# Patient Record
Sex: FEMALE | Race: WHITE | NOT HISPANIC OR LATINO | URBAN - METROPOLITAN AREA
[De-identification: names, ages, dates, MRNs, and addresses within clinical notes are randomized per-mention and may not be internally consistent; named-entity substitution may affect disease eponyms.]

---

## 2018-03-20 ENCOUNTER — OFFICE VISIT CONVERTED (OUTPATIENT)
Dept: FAMILY MEDICINE CLINIC | Facility: CLINIC | Age: 48
End: 2018-03-20
Attending: FAMILY MEDICINE

## 2018-03-29 ENCOUNTER — OFFICE VISIT CONVERTED (OUTPATIENT)
Dept: ORTHOPEDIC SURGERY | Facility: CLINIC | Age: 48
End: 2018-03-29
Attending: ORTHOPAEDIC SURGERY

## 2019-07-01 ENCOUNTER — OFFICE VISIT CONVERTED (OUTPATIENT)
Dept: FAMILY MEDICINE CLINIC | Facility: CLINIC | Age: 49
End: 2019-07-01
Attending: FAMILY MEDICINE

## 2019-07-01 ENCOUNTER — HOSPITAL ENCOUNTER (OUTPATIENT)
Dept: FAMILY MEDICINE CLINIC | Facility: CLINIC | Age: 49
Discharge: HOME | End: 2019-07-01
Attending: FAMILY MEDICINE

## 2019-07-01 LAB
ALBUMIN SERPL-MCNC: 4.2 G/DL (ref 3.5–5)
ALBUMIN/GLOB SERPL: 1.6 {RATIO} (ref 1.4–2.6)
ALP SERPL-CCNC: 83 U/L (ref 42–98)
ALT SERPL-CCNC: 9 U/L (ref 10–40)
AMPHETAMINES UR QL SCN: NEGATIVE
ANION GAP SERPL CALC-SCNC: 14 MMOL/L (ref 8–19)
AST SERPL-CCNC: 9 U/L (ref 15–50)
BARBITURATES UR QL SCN: NEGATIVE
BASOPHILS # BLD AUTO: 0.06 10*3/UL (ref 0–0.2)
BASOPHILS NFR BLD AUTO: 0.7 % (ref 0–3)
BENZODIAZ UR QL SCN: NEGATIVE
BILIRUB SERPL-MCNC: 0.28 MG/DL (ref 0.2–1.3)
BUN SERPL-MCNC: 10 MG/DL (ref 5–25)
BUN/CREAT SERPL: 10 {RATIO} (ref 6–20)
CALCIUM SERPL-MCNC: 9.1 MG/DL (ref 8.7–10.4)
CHLORIDE SERPL-SCNC: 100 MMOL/L (ref 99–111)
CHOLEST SERPL-MCNC: 174 MG/DL (ref 107–200)
CHOLEST/HDLC SERPL: 5.1 {RATIO} (ref 3–6)
CONV ABS IMM GRAN: 0.02 10*3/UL (ref 0–0.2)
CONV CO2: 30 MMOL/L (ref 22–32)
CONV COCAINE, UR: NEGATIVE
CONV IMMATURE GRAN: 0.2 % (ref 0–1.8)
CONV TOTAL PROTEIN: 6.9 G/DL (ref 6.3–8.2)
CREAT UR-MCNC: 0.96 MG/DL (ref 0.5–0.9)
DEPRECATED RDW RBC AUTO: 47.2 FL (ref 36.4–46.3)
EOSINOPHIL # BLD AUTO: 0.14 10*3/UL (ref 0–0.7)
EOSINOPHIL # BLD AUTO: 1.5 % (ref 0–7)
ERYTHROCYTE [DISTWIDTH] IN BLOOD BY AUTOMATED COUNT: 12.7 % (ref 11.7–14.4)
EST. AVERAGE GLUCOSE BLD GHB EST-MCNC: 128 MG/DL
FOLATE SERPL-MCNC: 5.1 NG/ML (ref 4.8–20)
GFR SERPLBLD BASED ON 1.73 SQ M-ARVRAT: >60 ML/MIN/{1.73_M2}
GLOBULIN UR ELPH-MCNC: 2.7 G/DL (ref 2–3.5)
GLUCOSE SERPL-MCNC: 148 MG/DL (ref 65–99)
HBA1C MFR BLD: 13.8 G/DL (ref 12–16)
HBA1C MFR BLD: 6.1 % (ref 3.5–5.7)
HCT VFR BLD AUTO: 43.2 % (ref 37–47)
HDLC SERPL-MCNC: 34 MG/DL (ref 40–60)
LDLC SERPL CALC-MCNC: 98 MG/DL (ref 70–100)
LYMPHOCYTES # BLD AUTO: 3.42 10*3/UL (ref 1–5)
MCH RBC QN AUTO: 31.9 PG (ref 27–31)
MCHC RBC AUTO-ENTMCNC: 31.9 G/DL (ref 33–37)
MCV RBC AUTO: 100 FL (ref 81–99)
METHADONE UR QL SCN: NEGATIVE
MONOCYTES # BLD AUTO: 0.38 10*3/UL (ref 0.2–1.2)
MONOCYTES NFR BLD AUTO: 4.2 % (ref 3–10)
NEUTROPHILS # BLD AUTO: 5.13 10*3/UL (ref 2–8)
NEUTROPHILS NFR BLD AUTO: 56 % (ref 30–85)
NRBC CBCN: 0 % (ref 0–0.7)
OPIATES TESTED UR SCN: NEGATIVE
OSMOLALITY SERPL CALC.SUM OF ELEC: 294 MOSM/KG (ref 273–304)
OXYCODONE UR QL SCN: NEGATIVE
PCP UR QL: NEGATIVE
PLATELET # BLD AUTO: 227 10*3/UL (ref 130–400)
PMV BLD AUTO: 12.6 FL (ref 9.4–12.3)
POTASSIUM SERPL-SCNC: 3.4 MMOL/L (ref 3.5–5.3)
RBC # BLD AUTO: 4.32 10*6/UL (ref 4.2–5.4)
SODIUM SERPL-SCNC: 141 MMOL/L (ref 135–147)
THC SERPLBLD CFM-MCNC: POSITIVE NG/ML
TRIGL SERPL-MCNC: 212 MG/DL (ref 40–150)
TSH SERPL-ACNC: 4.07 M[IU]/L (ref 0.27–4.2)
VARIANT LYMPHS NFR BLD MANUAL: 37.4 % (ref 20–45)
VIT B12 SERPL-MCNC: 417 PG/ML (ref 211–911)
VLDLC SERPL-MCNC: 42 MG/DL (ref 5–37)
WBC # BLD AUTO: 9.15 10*3/UL (ref 4.8–10.8)

## 2019-07-03 LAB
AMOXICILLIN+CLAV SUSC ISLT: >=32
AMPICILLIN SUSC ISLT: >=32
AMPICILLIN+SULBAC SUSC ISLT: >=32
BACTERIA UR CULT: ABNORMAL
CEFAZOLIN SUSC ISLT: >=64
CEFEPIME SUSC ISLT: 2
CEFTAZIDIME SUSC ISLT: <=1
CEFTRIAXONE SUSC ISLT: >=64
CEFUROXIME ORAL SUSC ISLT: >=64
CEFUROXIME PARENTER SUSC ISLT: >=64
CIPROFLOXACIN SUSC ISLT: >=4
ERTAPENEM SUSC ISLT: <=0.5
GENTAMICIN SUSC ISLT: <=1
LEVOFLOXACIN SUSC ISLT: >=8
NITROFURANTOIN SUSC ISLT: <=16
TETRACYCLINE SUSC ISLT: 2
TMP SMX SUSC ISLT: >=320
TOBRAMYCIN SUSC ISLT: <=1

## 2019-07-08 ENCOUNTER — HOSPITAL ENCOUNTER (OUTPATIENT)
Dept: FAMILY MEDICINE CLINIC | Facility: CLINIC | Age: 49
Discharge: HOME | End: 2019-07-08
Attending: FAMILY MEDICINE

## 2019-07-08 ENCOUNTER — OFFICE VISIT CONVERTED (OUTPATIENT)
Dept: FAMILY MEDICINE CLINIC | Facility: CLINIC | Age: 49
End: 2019-07-08
Attending: FAMILY MEDICINE

## 2019-07-08 ENCOUNTER — CONVERSION ENCOUNTER (OUTPATIENT)
Dept: FAMILY MEDICINE CLINIC | Facility: CLINIC | Age: 49
End: 2019-07-08

## 2019-07-08 LAB
ALBUMIN SERPL-MCNC: 4.5 G/DL (ref 3.5–5)
ALBUMIN/GLOB SERPL: 1.5 {RATIO} (ref 1.4–2.6)
ALP SERPL-CCNC: 88 U/L (ref 42–98)
ALT SERPL-CCNC: 12 U/L (ref 10–40)
ANION GAP SERPL CALC-SCNC: 20 MMOL/L (ref 8–19)
AST SERPL-CCNC: 12 U/L (ref 15–50)
BILIRUB SERPL-MCNC: 0.38 MG/DL (ref 0.2–1.3)
BUN SERPL-MCNC: 9 MG/DL (ref 5–25)
BUN/CREAT SERPL: 9 {RATIO} (ref 6–20)
CALCIUM SERPL-MCNC: 9.6 MG/DL (ref 8.7–10.4)
CHLORIDE SERPL-SCNC: 96 MMOL/L (ref 99–111)
CK SERPL-CCNC: 76 U/L (ref 35–230)
CONV CO2: 29 MMOL/L (ref 22–32)
CONV TOTAL PROTEIN: 7.6 G/DL (ref 6.3–8.2)
CREAT UR-MCNC: 0.99 MG/DL (ref 0.5–0.9)
GFR SERPLBLD BASED ON 1.73 SQ M-ARVRAT: >60 ML/MIN/{1.73_M2}
GLOBULIN UR ELPH-MCNC: 3.1 G/DL (ref 2–3.5)
GLUCOSE SERPL-MCNC: 98 MG/DL (ref 65–99)
MAGNESIUM SERPL-MCNC: 2.15 MG/DL (ref 1.6–2.3)
OSMOLALITY SERPL CALC.SUM OF ELEC: 291 MOSM/KG (ref 273–304)
POTASSIUM SERPL-SCNC: 3.8 MMOL/L (ref 3.5–5.3)
SODIUM SERPL-SCNC: 141 MMOL/L (ref 135–147)

## 2019-07-10 LAB
AMOXICILLIN+CLAV SUSC ISLT: >=32
AMPICILLIN SUSC ISLT: >=32
AMPICILLIN+SULBAC SUSC ISLT: >=32
BACTERIA UR CULT: ABNORMAL
CEFAZOLIN SUSC ISLT: >=64
CEFEPIME SUSC ISLT: 2
CEFTAZIDIME SUSC ISLT: <=1
CEFTRIAXONE SUSC ISLT: 32
CEFUROXIME ORAL SUSC ISLT: >=64
CEFUROXIME PARENTER SUSC ISLT: >=64
CIPROFLOXACIN SUSC ISLT: >=4
ERTAPENEM SUSC ISLT: <=0.5
GENTAMICIN SUSC ISLT: <=1
LEVOFLOXACIN SUSC ISLT: >=8
NITROFURANTOIN SUSC ISLT: <=16
TETRACYCLINE SUSC ISLT: 2
TMP SMX SUSC ISLT: >=320
TOBRAMYCIN SUSC ISLT: <=1

## 2019-09-18 ENCOUNTER — CONVERSION ENCOUNTER (OUTPATIENT)
Dept: NEUROLOGY | Facility: CLINIC | Age: 49
End: 2019-09-18

## 2019-09-18 ENCOUNTER — OFFICE VISIT CONVERTED (OUTPATIENT)
Dept: NEUROLOGY | Facility: CLINIC | Age: 49
End: 2019-09-18
Attending: PSYCHIATRY & NEUROLOGY

## 2019-10-16 ENCOUNTER — OFFICE VISIT CONVERTED (OUTPATIENT)
Dept: NEUROLOGY | Facility: CLINIC | Age: 49
End: 2019-10-16
Attending: PSYCHIATRY & NEUROLOGY

## 2019-11-21 ENCOUNTER — OFFICE VISIT CONVERTED (OUTPATIENT)
Dept: FAMILY MEDICINE CLINIC | Facility: CLINIC | Age: 49
End: 2019-11-21
Attending: FAMILY MEDICINE

## 2019-11-21 ENCOUNTER — HOSPITAL ENCOUNTER (OUTPATIENT)
Dept: FAMILY MEDICINE CLINIC | Facility: CLINIC | Age: 49
Discharge: HOME | End: 2019-11-21
Attending: FAMILY MEDICINE

## 2020-02-20 ENCOUNTER — OFFICE VISIT CONVERTED (OUTPATIENT)
Dept: ORTHOPEDIC SURGERY | Facility: CLINIC | Age: 50
End: 2020-02-20
Attending: ORTHOPAEDIC SURGERY

## 2020-03-24 ENCOUNTER — TELEMEDICINE CONVERTED (OUTPATIENT)
Dept: NEUROLOGY | Facility: CLINIC | Age: 50
End: 2020-03-24
Attending: PSYCHIATRY & NEUROLOGY

## 2020-04-15 ENCOUNTER — HOSPITAL ENCOUNTER (OUTPATIENT)
Dept: URGENT CARE | Facility: CLINIC | Age: 50
Discharge: HOME | End: 2020-04-15
Attending: EMERGENCY MEDICINE

## 2020-07-04 LAB — SARS-COV-2 RNA SPEC QL NAA+PROBE: NOT DETECTED

## 2020-07-06 ENCOUNTER — HOSPITAL ENCOUNTER (OUTPATIENT)
Dept: PERIOP | Facility: HOSPITAL | Age: 50
Setting detail: HOSPITAL OUTPATIENT SURGERY
Discharge: HOME | End: 2020-07-06
Attending: ORTHOPAEDIC SURGERY

## 2020-07-21 ENCOUNTER — CONVERSION ENCOUNTER (OUTPATIENT)
Dept: ORTHOPEDIC SURGERY | Facility: CLINIC | Age: 50
End: 2020-07-21

## 2020-07-21 ENCOUNTER — OFFICE VISIT CONVERTED (OUTPATIENT)
Dept: ORTHOPEDIC SURGERY | Facility: CLINIC | Age: 50
End: 2020-07-21
Attending: ORTHOPAEDIC SURGERY

## 2020-09-14 ENCOUNTER — HOSPITAL ENCOUNTER (OUTPATIENT)
Dept: FAMILY MEDICINE CLINIC | Facility: CLINIC | Age: 50
Discharge: HOME | End: 2020-09-14
Attending: FAMILY MEDICINE

## 2020-09-14 ENCOUNTER — OFFICE VISIT CONVERTED (OUTPATIENT)
Dept: FAMILY MEDICINE CLINIC | Facility: CLINIC | Age: 50
End: 2020-09-14
Attending: FAMILY MEDICINE

## 2020-09-14 LAB
ALBUMIN SERPL-MCNC: 4.3 G/DL (ref 3.5–5)
ALBUMIN/GLOB SERPL: 1.8 {RATIO} (ref 1.4–2.6)
ALP SERPL-CCNC: 72 U/L (ref 42–98)
ALT SERPL-CCNC: 9 U/L (ref 10–40)
ANION GAP SERPL CALC-SCNC: 14 MMOL/L (ref 8–19)
AST SERPL-CCNC: 13 U/L (ref 15–50)
BASOPHILS # BLD AUTO: 0.03 10*3/UL (ref 0–0.2)
BASOPHILS NFR BLD AUTO: 0.3 % (ref 0–3)
BILIRUB SERPL-MCNC: 0.22 MG/DL (ref 0.2–1.3)
BUN SERPL-MCNC: 9 MG/DL (ref 5–25)
BUN/CREAT SERPL: 11 {RATIO} (ref 6–20)
CALCIUM SERPL-MCNC: 9 MG/DL (ref 8.7–10.4)
CHLORIDE SERPL-SCNC: 102 MMOL/L (ref 99–111)
CHOLEST SERPL-MCNC: 246 MG/DL (ref 107–200)
CHOLEST/HDLC SERPL: 6.5 {RATIO} (ref 3–6)
CONV ABS IMM GRAN: 0.03 10*3/UL (ref 0–0.2)
CONV CO2: 30 MMOL/L (ref 22–32)
CONV CREATININE URINE, RANDOM: 50 MG/DL (ref 10–300)
CONV IMMATURE GRAN: 0.3 % (ref 0–1.8)
CONV MICROALBUM.,U,RANDOM: <12 MG/L (ref 0–20)
CONV TOTAL PROTEIN: 6.7 G/DL (ref 6.3–8.2)
CREAT UR-MCNC: 0.8 MG/DL (ref 0.5–0.9)
DEPRECATED RDW RBC AUTO: 48.8 FL (ref 36.4–46.3)
EOSINOPHIL # BLD AUTO: 0.16 10*3/UL (ref 0–0.7)
EOSINOPHIL # BLD AUTO: 1.5 % (ref 0–7)
ERYTHROCYTE [DISTWIDTH] IN BLOOD BY AUTOMATED COUNT: 13.2 % (ref 11.7–14.4)
EST. AVERAGE GLUCOSE BLD GHB EST-MCNC: 134 MG/DL
FOLATE SERPL-MCNC: 6.3 NG/ML (ref 4.8–20)
GFR SERPLBLD BASED ON 1.73 SQ M-ARVRAT: >60 ML/MIN/{1.73_M2}
GLOBULIN UR ELPH-MCNC: 2.4 G/DL (ref 2–3.5)
GLUCOSE SERPL-MCNC: 130 MG/DL (ref 65–99)
HBA1C MFR BLD: 6.3 % (ref 3.5–5.7)
HCT VFR BLD AUTO: 42.8 % (ref 37–47)
HDLC SERPL-MCNC: 38 MG/DL (ref 40–60)
HGB BLD-MCNC: 13.8 G/DL (ref 12–16)
LDLC SERPL CALC-MCNC: 175 MG/DL (ref 70–100)
LYMPHOCYTES # BLD AUTO: 3.28 10*3/UL (ref 1–5)
LYMPHOCYTES NFR BLD AUTO: 31.6 % (ref 20–45)
MCH RBC QN AUTO: 32.2 PG (ref 27–31)
MCHC RBC AUTO-ENTMCNC: 32.2 G/DL (ref 33–37)
MCV RBC AUTO: 100 FL (ref 81–99)
MICROALBUMIN/CREAT UR: 24 MG/G{CRE} (ref 0–35)
MONOCYTES # BLD AUTO: 0.42 10*3/UL (ref 0.2–1.2)
MONOCYTES NFR BLD AUTO: 4.1 % (ref 3–10)
NEUTROPHILS # BLD AUTO: 6.45 10*3/UL (ref 2–8)
NEUTROPHILS NFR BLD AUTO: 62.2 % (ref 30–85)
NRBC CBCN: 0 % (ref 0–0.7)
OSMOLALITY SERPL CALC.SUM OF ELEC: 294 MOSM/KG (ref 273–304)
PLATELET # BLD AUTO: 193 10*3/UL (ref 130–400)
PMV BLD AUTO: 12.6 FL (ref 9.4–12.3)
POTASSIUM SERPL-SCNC: 3.6 MMOL/L (ref 3.5–5.3)
RBC # BLD AUTO: 4.28 10*6/UL (ref 4.2–5.4)
SODIUM SERPL-SCNC: 142 MMOL/L (ref 135–147)
T4 FREE SERPL-MCNC: 1 NG/DL (ref 0.9–1.8)
TRIGL SERPL-MCNC: 163 MG/DL (ref 40–150)
TSH SERPL-ACNC: 4.62 M[IU]/L (ref 0.27–4.2)
VIT B12 SERPL-MCNC: 344 PG/ML (ref 211–911)
VLDLC SERPL-MCNC: 33 MG/DL (ref 5–37)
WBC # BLD AUTO: 10.37 10*3/UL (ref 4.8–10.8)

## 2021-04-01 ENCOUNTER — HOSPITAL ENCOUNTER (OUTPATIENT)
Dept: PREADMISSION TESTING | Facility: HOSPITAL | Age: 51
Discharge: HOME | End: 2021-04-01
Attending: ORTHOPAEDIC SURGERY

## 2021-04-01 ENCOUNTER — OFFICE VISIT CONVERTED (OUTPATIENT)
Dept: ORTHOPEDIC SURGERY | Facility: CLINIC | Age: 51
End: 2021-04-01
Attending: ORTHOPAEDIC SURGERY

## 2021-04-02 LAB — SARS-COV-2 RNA SPEC QL NAA+PROBE: NOT DETECTED

## 2021-04-05 ENCOUNTER — HOSPITAL ENCOUNTER (OUTPATIENT)
Dept: PERIOP | Facility: HOSPITAL | Age: 51
Setting detail: HOSPITAL OUTPATIENT SURGERY
Discharge: HOME | End: 2021-04-05
Attending: ORTHOPAEDIC SURGERY

## 2021-04-16 ENCOUNTER — OFFICE VISIT CONVERTED (OUTPATIENT)
Dept: ORTHOPEDIC SURGERY | Facility: CLINIC | Age: 51
End: 2021-04-16
Attending: ORTHOPAEDIC SURGERY

## 2021-05-07 NOTE — PROGRESS NOTES
Progress Note      Patient Name: Shantal Blackman   Patient ID: [id number]   Sex: Female   YOB: 1970    Primary Care Provider: Jevon Fernandez MD   Referring Provider: Jevon Fernandez MD    Visit Date: July 8, 2019    Provider: Jevon Fernandez MD   Location: [facility] [place]   Location Address: [address] [place] [address]   [place]  [address]   Location Phone: [phone]          Chief Complaint     1 week follow up for UTI. still having some issues, still strong.    today punched the van. right hand swollen bruised and painful. 2-4 metatarsal heads and shafts. worse swelling and bruise at 3-4 upper metatarsals.       History Of Present Illness  Shantal Blackman is a 49 year old /White female who presents for evaluation and treatment of:      pt still having urinary symptoms- bilaterally back- a little dysuria- pt says no chance for any STD  discussed labs  hyperlipidemia- uncontrolled  DM II- controlled  hypokalemia  pt having muscle pains in back- no bowel or bladder incontinence  right hand pain and bruising x 2 days after hitting van because she developed flat tire    pt told to wear sunscreen when out in sun and taking doxycycline    xrays:no fxs    pt told to call if not better in 1 week and then may refer to hand surgery       Past Medical History  Disease Name Date Onset Notes   Anxiety --  --    Arthritis --  --    Bipolar disorder --  --    COPD (chronic obstructive pulmonary disease) --  --    Depression --  --    Diabetes mellitus, type 2 --  --    GERD (gastroesophageal reflux disease) --  --    Irritable bowel syndrome --  --    Migraines --  --    Seasonal allergies --  --    Suicide Attempt; unspecified means --  --          Past Surgical History  Procedure Name Date Notes   Hysterectomy --  --    Hysterectomy (due to cancer) --  --    Tubal ligation --  --          Medication List  Name Date Started Instructions   aspirin 81 mg oral tablet,delayed release  (DR/EC) 03/27/2018 TAKE ONE TABLET BY MOUTH DAILY   furosemide 20 mg oral tablet 03/20/2018 take 1 tablet (20 mg) by oral route once daily for 30 days   gabapentin 400 mg oral capsule  take 2 capsules by oral route 3 times a day   hydrocodone-acetaminophen 7.5-325 mg oral tablet  take 1 tablet by oral route every 4-6 hours as needed for pain   Linzess 145 mcg oral capsule 03/20/2018 TAKE ONE CAPSULE BY MOUTH DAILY AS NEEDED FOR CONSTIPATION   pantoprazole 40 mg oral tablet,delayed release (DR/EC) 03/20/2018 TAKE ONE TABLET BY MOUTH DAILY 30 MINUTES BEFORE BREAKFAST DAILY   potassium chloride 10 mEq oral capsule, extended release 07/02/2019 take 1 capsule (10 meq) by oral route once daily with food for 30 days         Allergy List  Allergen Name Date Reaction Notes   Celexa --  --  --    PENICILLINS --  --  --          Family Medical History  Disease Name Relative/Age Notes   Chronic Obstructive Pulmonary Disease Mother/   Mother   Bipolar Disorder Mother/   Mother   Depression Mother/   Mother   DM Type II Mother/   Mother   Hypertension Mother/   Mother   Alcohol Abuse Father/   Father         Social History  Finding Status Start/Stop Quantity Notes   Alcohol Former --/-- --  drank alcohol in the past, 7 or less drinks per week   Exercises regularly --  --/-- --  occasionally   Recreational Drug Use Never --/-- --  never used   Second hand smoke exposure Unknown --/-- --  no   Tobacco Current every day --/-- 3-4 per day current some day smoker, for less than 5 years, currently uses other tobacco products   Uses seatbelts --  --/-- --  yes         Review of Systems  · Constitutional  o Denies  o : fatigue, fever  · Cardiovascular  o Denies  o : chest pain, palpitations  · Respiratory  o Denies  o : shortness of breath, cough  · Gastrointestinal  o Denies  o : nausea, vomiting, diarrhea  · Genitourinary  o Admits  o : dysuria  o Denies  o : urinary retention  · Musculoskeletal  o Admits  o : muscle pain, back  "pain      Vitals  Date Time BP Position Site L\R Cuff Size HR RR TEMP (F) WT  HT  BMI kg/m2 BSA m2 O2 Sat HC       07/08/2019 01:18 /82 Sitting    77 - R  96.7 163lbs 0oz 5'  9\" 24.07 1.9 98 %          Physical Examination  · Constitutional  o Appearance  o : well developed, well-nourished, in no acute distress  · Head and Face  o HEENT  o : Unremarkable  · Eyes  o Conjunctivae  o : conjunctivae normal  · Neck  o Inspection/Palpation  o : supple  o Thyroid  o : no thyromegaly  · Respiratory  o Respiratory Effort  o : breathing unlabored  o Auscultation of Lungs  o : clear to ascultation  · Cardiovascular  o Heart  o :   § Auscultation of Heart  § : regular rate and rhythm  o Peripheral Vascular System  o :   § Extremities  § : no edema  · Gastrointestinal  o Abdomen  o : soft, non-tender, non-distended, + bowel sounds, no hepatosplenomegaly, no masses palpated, no CVA tenderness  · Musculoskeletal  o General  o :   § General Musculoskeletal  § : No joint swelling or deformity., Muscle tone, strength, and development grossly normal.  · Neurologic  o Gait and Station  o :   § Gait Screening  § : normal gait  · Psychiatric  o Mood and Affect  o : mood normal, affect appropriate          Assessment  · Diabetes mellitus, type 2     250.00/E11.9  · Hyperlipidemia     272.4/E78.5  · Hand injury, right, initial encounter     959.4/S69.91XA  · UTI (urinary tract infection)     599.0/N39.0  · Hypokalemia     276.8/E87.6  · Muscle pain     729.1/M79.10  · Back pain     724.5/M54.9      Plan  · Orders  o CMP [facility] (99473) - 276.8/E87.6, 250.00/E11.9 - 07/08/2019  o IOP - Urinalysis without Microscopy (Clinitek) [facility] (41840) - 959.4/S69.91XA, 599.0/N39.0 - 07/08/2019   GLU NEG, KEVYN NEG, KET NEG, SG 1.025, BLO NEG, PH 6.5, PRO NEG, URO 0.2, NIT NEG, STACEY TRACE  o Urine culture (47899, 04983) - 959.4/S69.91XA, 599.0/N39.0 - 07/08/2019  o ACO-39: Current medications updated and reviewed () - - 07/08/2019  o Xray hand right " [facility] ([id number]-EK) - 959.4/S69.91XA - 07/08/2019  o CPK Total [facility] (63892) - 729.1/M79.10 - 07/08/2019  o Magnesium level (27765) - 729.1/M79.10 - 07/08/2019  o US kidney bilateral (68756, 57445) - 724.5/M54.9 - 07/08/2019  · Medications  o Fish Oil 1,000 mg (120 mg-180 mg) oral capsule   SIG: take 2 capsules by oral route 2 times a day for 30 days   DISP: (120) capsules with 5 refills  Prescribed on 07/08/2019     o doxycycline monohydrate 100 mg oral capsule   SIG: take 1 capsule by oral route 2 times a day for 7 days   DISP: (14) capsules with 0 refills  Prescribed on 07/08/2019     · Instructions  o Advised that cheeses and other sources of dairy fats, animal fats, fast food, and the extras (candy, pasteries, pies, doughnuts and cookies) all contain LDL raising nutrients. Advised to increase fruits, vegetables, whole grains, and to monitor portion sizes.   o Patient was educated/instructed on their diagnosis, treatment and medications prior to discharge from the clinic today.  o Placed pt in boxer splint.            Electronically Signed by: Jevon Fernandez MD -Author on July 22, 2019 01:35:48 PM

## 2021-05-07 NOTE — PROGRESS NOTES
Progress Note      Patient Name: Shantal Blackman   Patient ID: [id number]   Sex: Female   YOB: 1970        Visit Date: March 20, 2018    Provider: Jevon Fernandez MD   Location: [facility]   Location Address: [address] [place] [address]  [id number]   Location Phone: [phone]          Chief Complaint     fup from ER for sprained left ankle       History Of Present Illness  Shantal Blackman is a 47 year old /White female who presents for evaluation and treatment of:      pt seeing endocrinology for DM II and hyperlipidemia    pt has severe left lateral ankle strain- occurred 2 weeks ago-  and swollen- pt had been placed in air cast- no relief- seen in ER- had xray- no fractures  need refills on meds  pt having more problems with anxiety- need increase in buspar- gave phone numbers to psyche- pt has tried multiple meds including lithium- pt having a lot of family problems  pt smokes 1/4ppd- pt urged to quit smoking  HTN- controlled on meds  last HGM A1C less than 7- seeing dr Baig feb 2018- DM II- controlled- pt seen eye doctor this year       Past Medical History  Disease Name Date Onset Notes   Anxiety --  --    Arthritis --  --    Bipolar disorder --  --    COPD (chronic obstructive pulmonary disease) --  --    Depression --  --    Diabetes mellitus, type 2 --  --    GERD (gastroesophageal reflux disease) --  --    Irritable Bowel Syndrome --  --    Migraines --  --    Seasonal allergies --  --    Suicide Attempt; unspecified means --  --          Past Surgical History  Procedure Name Date Notes   Hysterectomy --  --    Hysterectomy (due to cancer) --  --    Tubal ligation --  --          Medication List  Name Date Started Instructions   Actos 15 mg oral tablet  take 1 tablet (15 mg) by oral route once daily   amitriptyline 50 mg oral tablet  take 1 tablet (50 mg) by oral route once daily at bedtime   aspirin 81 mg oral tablet,delayed  release (DR/EC) 01/09/2018 TAKE ONE TABLET BY MOUTH DAILY   atorvastatin 20 mg oral tablet  take 1 tablet (20 mg) by oral route once daily   buspirone 7.5 mg oral tablet 01/02/2018 TAKE ONE TABLET BY MOUTH TWICE A DAY   duloxetine 60 mg oral capsule,delayed release(DR/EC)  take 1 capsule (60 mg) by oral route once daily   furosemide 20 mg oral tablet  take 1 tablet (20 mg) by oral route once daily   gabapentin 400 mg oral capsule  take 2 capsules by oral route 3 times a day   glimepiride 2 mg oral tablet  take 1 tablet by oral route 2 times a day   hydrocodone-acetaminophen 7.5-325 mg oral tablet  take 1 tablet by oral route every 4-6 hours as needed for pain   hydroxyzine HCl 10 mg oral tablet  take 2 tablets by oral route 4 times a day   Lasix 20 mg oral tablet 01/09/2018 TAKE ONE TABLET BY MOUTH DAILY   Linzess 145 mcg oral capsule 01/09/2018 TAKE ONE CAPSULE BY MOUTH DAILY AS NEEDED FOR CONSTIPATION   melatonin 2.5 mg oral tablet,chewable  chew 1 tablet by oral route once a day (at bedtime)   meloxicam 15 mg oral tablet  take 1 tablet (15 mg) by oral route once daily   pantoprazole 40 mg oral tablet,delayed release (DR/EC) 12/14/2017 TAKE ONE TABLET BY MOUTH DAILY 30 MINUTES BEFORE BREAKFAST DAILY         Allergy List  Allergen Name Date Reaction Notes   Celexa --  --  --    PENICILLINS --  --  --          Family Medical History  Disease Name Relative/Age Notes   Alcohol Abuse / Father    Father/    Bipolar Disorder / Mother    Mother/    Chronic Obstructive Pulmonary Disease / Mother    Mother/    Depression / Mother    Mother/    DM Type II / Mother    Mother/    Hypertension / Mother    Mother/          Social History  Finding Status Start/Stop Quantity Notes   Alcohol Former --/-- --  drank alcohol in the past, 7 or less drinks per week   Exercises regularly --  --/-- --  occasionally   Recreational Drug Use Never --/-- --  never used   Second hand smoke exposure Unknown --/-- --  no   Tobacco Current every  "day --/-- 3-4 per day current some day smoker, for less than 5 years, currently uses other tobacco products   Uses seatbelts --  --/-- --  yes         Review of Systems  · Constitutional  o Denies  o : fatigue, fever  · Cardiovascular  o Denies  o : chest pain, palpitations  · Respiratory  o Denies  o : shortness of breath, cough  · Gastrointestinal  o Denies  o : nausea, vomiting, diarrhea  · Musculoskeletal  o Admits  o : ankle pain  · Psychiatric  o Admits  o : anxiety, depression  o Denies  o : suicidal ideation, homicidal ideation      Vitals  Date Time BP Position Site L\R Cuff Size HR RR TEMP(F) WT  HT  BMI kg/m2 BSA m2 O2 Sat HC       03/20/2018 04:54 /72 Sitting    85 - R  97.3 225lbs 0oz 5'  9\" 33.23 2.23 99 %           Physical Examination  · Constitutional  o Appearance  o : well developed, well-nourished, in no acute distress  · Respiratory  o Respiratory Effort  o : breathing unlabored  o Auscultation of Lungs  o : clear to ascultation  · Cardiovascular  o Heart  o :   § Auscultation of Heart  § : regular rate and rhythm  o Peripheral Vascular System  o :   § Extremities  § : no edema  · Gastrointestinal  o Abdominal Examination  o :   § Abdomen  § : active bowel sounds, nontender, no hepatosplenomegaly, no masses palpated  · Musculoskeletal  o General  o :   § General Musculoskeletal  § : left lateral ankle swelling, tenderness, bruising, normal ROM, stable, no redness, no warmth.  · Neurologic  o Gait and Station  o :   § Gait Screening  § : normal gait  · Psychiatric  o Mood and Affect  o : mood normal, affect appropriate  · Left DM Foot Exam  o Sensation  o : normal sensory exam perceptible to 10-gram nylon monofilament exam (5/5), vibration and light touch.  o Visual Inspection  o : visual inspection is normal with no signs of breakdown, ulcerations or deformities unless otherwise noted.  o Vascular  o : palpable dorsalis pedis and posterir tibialis pulses present, normal capillary " refill.  · Right DM Foot Exam  o Sensation  o : normal sensory exam perceptible to 10-gram nylon monofilament exam (5/5), vibration and light touch.  o Visual Inspection  o : visual inspection is normal with no signs of breakdown, ulcerations or deformities unless otherwise noted.   o Vascular  o : palpable dorsalis pedis and posterir tibialis pulses present, normal capillary refill.          Assessment  · Diabetes mellitus, type 2     250.00/E11.9  · Essential hypertension     401.9/I10  · Left ankle injury     959.7/S99.912A  · Anxiety and depression       Other specified anxiety disorders     300.00/F41.8      Plan  · Orders  o ACO-27: Most recent 2017 HgbA1c less than 7 [facility] (3044F) - 250.00/E11.9 - 03/20/2018  o Diabetic Foot (Motor and Sensory) Exam Completed [facility] (, , 2028F) - 250.00/E11.9 - 03/20/2018  o CBC with Auto Diff [facility] (81310) - 959.7/S99.912A, 300.00/F41.8, 401.9/I10 - 03/20/2018  o CMP [facility] (86301) - 959.7/S99.912A, 300.00/F41.8, 401.9/I10 - 03/20/2018  o TSH [facility] (51550) - 959.7/S99.912A, 300.00/F41.8, 401.9/I10 - 03/20/2018  o Most recent hemoglobin a1c (hba1c) level less than 7 (3044F) - - 03/20/2018  o ACO-17: Screened for tobacco use AND received tobacco cessation intervention (4004F) - - 03/20/2018  o ACO-39: Current medications updated and reviewed () - - 03/20/2018  o ACO-18: Screening for clinical depression not completed due to current diagnosis of depression or bipolar disorder () - - 03/20/2018  o ORTHOPEDIC CONSULTATION (ORTHO) - 959.7/S99.912A - 03/20/2018  · Medications  o amitriptyline 50 mg oral tablet   SIG: take 1 tablet (50 mg) by oral route once daily at bedtime for 30 days   DISP: (30) tablet with 5 refills  Adjusted on 03/20/2018     o buspirone 10 mg oral tablet   SIG: take 1 tablet (10 mg) by oral route 2 times per day for 30 days   DISP: (60) tablets with 1 refills  Adjusted on 03/20/2018     o duloxetine 60 mg oral capsule,delayed release(DR/EC)   SIG:  take 1 capsule (60 mg) by oral route once daily for 30 days   DISP: (30) capsule with 5 refills  Adjusted on 03/20/2018     o furosemide 20 mg oral tablet   SIG: take 1 tablet (20 mg) by oral route once daily for 30 days   DISP: (30) tablet with 5 refills  Adjusted on 03/20/2018     o hydroxyzine HCl 10 mg oral tablet   SIG: take 2 tablets by oral route 4 times a day as needed for 30 days   DISP: (150) tablet with 5 refills  Adjusted on 03/20/2018     o Linzess 145 mcg oral capsule   SIG: TAKE ONE CAPSULE BY MOUTH DAILY AS NEEDED FOR CONSTIPATION   DISP: (30) Capsule with 5 refills  Adjusted on 03/20/2018     o melatonin 2.5 mg oral tablet,chewable   SIG: chew 1 tablet by oral route once a day (at bedtime) for 30 days   DISP: (30) tablet with 5 refills  Adjusted on 03/20/2018     o meloxicam 15 mg oral tablet   SIG: take 1 tablet (15 mg) by oral route once daily for 30 days   DISP: (30) tablet with 5 refills  Adjusted on 03/20/2018     o pantoprazole 40 mg oral tablet,delayed release (DR/EC)   SIG: TAKE ONE TABLET BY MOUTH DAILY 30 MINUTES BEFORE BREAKFAST DAILY   DISP: (30) Tablet with 5 refills  Adjusted on 03/20/2018     o Lasix 20 mg oral tablet   SIG: TAKE ONE TABLET BY MOUTH DAILY   DISP: (30) Tablet with 0 refills  Discontinued on 03/20/2018     · Instructions  o Daily foot care. Avoid walking barefoot. Annual Dilated Eye Exam.  o Handouts were given to patient: smoking cessation, psyche numbers.  o Take all medications as prescribed/directed.  o Patient was educated/instructed on their diagnosis, treatment and medications prior to discharge from the clinic today.  o placed pt in boot            Electronically Signed by: Jevon Fernandez MD -Author on March 20, 2018 05:49:43 PM

## 2021-05-07 NOTE — PROGRESS NOTES
Progress Note      Patient Name: Shantal Blackman   Patient ID: [id number]   Sex: Female   YOB: 1970    Primary Care Provider: Jevon Fernandez MD   Referring Provider: Jevon Fernandez MD    Visit Date: September 14, 2020    Provider: Jevon Fernandez MD   Location: [facility]   Location Address: [address] [place] [address] [place]  [address]   Location Phone: [phone]          Chief Complaint     heart burn  neuropathy       History Of Present Illness  Shantal Blackman is a 50 year old /White female who presents for evaluation and treatment of:      pt was on protonix and has been out of med for awhile and having severe GERD- needs to be restarted on med    pt has been out of pain management for awhile- having neuropathy in legs- they burn all the time from the neuropathy  DM II- unknown control- pt getting ready to see her eye doctor       Past Medical History  Disease Name Date Onset Notes   Anxiety --  --    Arthritis --  --    Bipolar disorder --  --    COPD (chronic obstructive pulmonary disease) --  --    Depression --  --    Diabetes mellitus, type 2 --  --    GERD (gastroesophageal reflux disease) --  --    Irritable bowel syndrome --  --    Migraines --  --    Seasonal allergies --  --    Suicide Attempt; unspecified means --  --          Past Surgical History  Procedure Name Date Notes   Hysterectomy --  --    Hysterectomy (due to cancer) --  --    Tubal ligation --  --          Allergy List  Allergen Name Date Reaction Notes   Celexa --  --  --    PENICILLINS --  --  --        Allergies Reconciled  Family Medical History  Disease Name Relative/Age Notes   Chronic Obstructive Pulmonary Disease Mother/   Mother   Bipolar Disorder Mother/   Mother   Depression Mother/   Mother   DM Type II Mother/   Mother   Hypertension Mother/   Mother   Alcohol abuse Father/   Father         Social History  Finding Status Start/Stop Quantity Notes   Alcohol Former --/-- --  drank  alcohol in the past, 7 or less drinks per week   Exercises regularly --  --/-- --  occasionally   Recreational Drug Use Never --/-- --  never used   Second hand smoke exposure Unknown --/-- --  no   Tobacco Current every day --/-- 3-4 per day current some day smoker, for less than 5 years, currently uses other tobacco products   Uses seatbelts --  --/-- --  yes         Review of Systems  · Constitutional  o Denies  o : fatigue, fever  · Cardiovascular  o Denies  o : chest pain, palpitations  · Respiratory  o Denies  o : shortness of breath, cough  · Gastrointestinal  o Admits  o : heartburn  o Denies  o : nausea, vomiting, diarrhea  · Psychiatric  o Denies  o : anxiety, depression      Vitals  Date Time BP Position Site L\R Cuff Size HR RR TEMP (F) WT  HT  BMI kg/m2 BSA m2 O2 Sat HC       09/14/2020 01:37 /84 Sitting    75 - R  99.1 183lbs 0oz    94 %          Physical Examination  · Constitutional  o Appearance  o : well developed, well-nourished, in no acute distress  · Respiratory  o Respiratory Effort  o : breathing unlabored  o Auscultation of Lungs  o : clear to ascultation  · Cardiovascular  o Heart  o :   § Auscultation of Heart  § : regular rate and rhythm  o Peripheral Vascular System  o :   § Extremities  § : no edema  · Gastrointestinal  o Abdomen  o : soft, non-tender, non-distended, + bowel sounds, no hepatosplenomegaly, no masses palpated  · Musculoskeletal  o General  o :   § General Musculoskeletal  § : No joint swelling or deformity., Muscle tone, strength, and development grossly normal.  · Neurologic  o Gait and Station  o :   § Gait Screening  § : normal gait  · Psychiatric  o Mood and Affect  o : mood normal, affect appropriate  · Left DM Foot Exam  o Sensation  o : reduced sensation in some areas   o Visual Inspection  o : normal except for hammer toes  o Vascular  o : palpable dorsalis pedis and posterir tibialis pulses present, normal capillary refill  · Right DM Foot  Exam  o Sensation  o : reduced sensation in some areas   o Visual Inspection  o : normal except for hammer toes  o Vascular  o : palpable dorsalis pedis and posterir tibialis pulses present, normal capillary refill          Assessment  · Diabetes mellitus, type 2     250.00/E11.9  · GERD (gastroesophageal reflux disease)     530.81/K21.9  · Diabetic peripheral neuropathy       Type 2 diabetes mellitus with diabetic polyneuropathy     250.60/E11.42  · Numbness     782.0/R20.0    Problems Reconciled  Plan  · Orders  o CBC with Auto Diff [facility] (82645) - 250.00/E11.9 - 09/14/2020  o CMP [facility] (00069) - 250.00/E11.9 - 09/14/2020  o Hgb A1c [facility] (17941) - 250.00/E11.9 - 09/14/2020  o Lipid Panel [facility] (20777) - 250.00/E11.9 - 09/14/2020  o Urine microalbumin (45772) - 250.00/E11.9 - 09/14/2020  o Thyroid Profile (THYII) - 250.00/E11.9 - 09/14/2020  o PODIATRY CONSULTATION (PODIA) - 250.00/E11.9 - 09/14/2020  o Diabetic Foot (Motor and Sensory) Exam Completed [facility] (, , 2028F) - 250.00/E11.9 - 09/14/2020  o ACO-39: Current medications updated and reviewed () - - 09/14/2020  o Vitamin B12 and folate measurement (90567, 36174) - 530.81/K21.9, 250.60/E11.42, 250.00/E11.9, 782.0/R20.0 - 09/14/2020  · Medications  o Protonix 40 mg oral tablet,delayed release (DR/EC)   SIG: take 1 tablet (40 mg) by oral route once daily for 30 days   DISP: (30) tablets with 5 refills  Prescribed on 09/14/2020     o Cymbalta 30 mg oral capsule,delayed release(DR/EC)   SIG: take 1 capsule (30 mg) by oral route once daily for 30 days   DISP: (30) capsules with 1 refills  Prescribed on 09/14/2020     o Medications have been Reconciled  o Transition of Care or Provider Policy  · Instructions  o Patient was educated/instructed on their diagnosis, treatment and medications prior to discharge from the clinic today.            Electronically Signed by: Jevon Fernandez MD -Author on September 14, 2020 02:00:43 PM

## 2021-05-07 NOTE — PROGRESS NOTES
Progress Note      Patient Name: Shantal Blackman   Patient ID: [id number]   Sex: Female   YOB: 1970    Referring Provider: Jevon Fernandez MD    Visit Date: July 1, 2019    Provider: Jevon Fernandez MD   Location: [facility]   Location Address: [address] [place] [address] [place]  [id number]   Location Phone: [phone]          Chief Complaint     started 2 wks ago, she gets lighted headed, then vision goes dark. body tingles; randy around lip area. then when vision comes back she feels nauseous. happens at least twice daily. Is able to catch self before she goes down. an episode lasts about 10-15 minutes.       History Of Present Illness  Shantal Blackman is a 49 year old /White female who presents for evaluation and treatment of:      pt having episodes of dizziness with vision changes that last 10-15 minutes then everything returns to normal- occurs twice weekly x 2 weeks- anxiety brings symptoms more frequently    pt has not been seeing endocrinology         Past Medical History  Disease Name Date Onset Notes   Anxiety --  --    Arthritis --  --    Bipolar disorder --  --    COPD (chronic obstructive pulmonary disease) --  --    Depression --  --    Diabetes mellitus, type 2 --  --    GERD (gastroesophageal reflux disease) --  --    Irritable bowel syndrome --  --    Migraines --  --    Seasonal allergies --  --    Suicide Attempt; unspecified means --  --          Past Surgical History  Procedure Name Date Notes   Hysterectomy --  --    Hysterectomy (due to cancer) --  --    Tubal ligation --  --          Medication List  Name Date Started Instructions   aspirin 81 mg oral tablet,delayed release (/EC) 03/27/2018 TAKE ONE TABLET BY MOUTH DAILY   furosemide 20 mg oral tablet 03/20/2018 take 1 tablet (20 mg) by oral route once daily for 30 days   gabapentin 400 mg oral capsule  take 2 capsules by oral route 3 times a day   hydrocodone-acetaminophen 7.5-325 mg oral  "tablet  take 1 tablet by oral route every 4-6 hours as needed for pain   Linzess 145 mcg oral capsule 03/20/2018 TAKE ONE CAPSULE BY MOUTH DAILY AS NEEDED FOR CONSTIPATION   pantoprazole 40 mg oral tablet,delayed release (DR/EC) 03/20/2018 TAKE ONE TABLET BY MOUTH DAILY 30 MINUTES BEFORE BREAKFAST DAILY         Allergy List  Allergen Name Date Reaction Notes   Celexa --  --  --    PENICILLINS --  --  --          Family Medical History  Disease Name Relative/Age Notes   Chronic Obstructive Pulmonary Disease Mother/   Mother   Bipolar Disorder Mother/   Mother   Depression Mother/   Mother   DM Type II Mother/   Mother   Hypertension Mother/   Mother   Alcohol Abuse Father/   Father         Social History  Finding Status Start/Stop Quantity Notes   Alcohol Former --/-- --  drank alcohol in the past, 7 or less drinks per week   Exercises regularly --  --/-- --  occasionally   Recreational Drug Use Never --/-- --  never used   Second hand smoke exposure Unknown --/-- --  no   Tobacco Current every day --/-- 3-4 per day current some day smoker, for less than 5 years, currently uses other tobacco products   Uses seatbelts --  --/-- --  yes         Review of Systems  · Constitutional  o Denies  o : fatigue, fever  · Eyes  o Admits  o : blurred vision, changes in vision  o Denies  o : double vision, impaired vision  · HENT  o Admits  o : headaches  · Cardiovascular  o Denies  o : chest pain, palpitations  · Respiratory  o Denies  o : shortness of breath, cough  · Gastrointestinal  o Denies  o : nausea, vomiting, diarrhea  · Neurologic  o Admits  o : headaches, dizziness  o Denies  o : altered mental status  · Psychiatric  o Denies  o : anxiety, depression      Vitals  Date Time BP Position Site L\R Cuff Size HR RR TEMP (F) WT  HT  BMI kg/m2 BSA m2 O2 Sat HC       07/01/2019 03:39 /70 Sitting    80 - R  98 164lbs 8oz 5'  9\" 24.29 1.91 98 %          Physical Examination  · Constitutional  o Appearance  o : well " developed, well-nourished, in no acute distress  · Head and Face  o HEENT  o : Unremarkable  · Eyes  o Conjunctivae  o : conjunctivae normal  o Pupils and Irises  o : pupils equal and round, pupils reactive to light bilaterally  · Neck  o Inspection/Palpation  o : supple  o Thyroid  o : no thyromegaly  · Respiratory  o Respiratory Effort  o : breathing unlabored  o Auscultation of Lungs  o : clear to ascultation  · Cardiovascular  o Heart  o :   § Auscultation of Heart  § : regular rate and rhythm  o Peripheral Vascular System  o :   § Extremities  § : no edema  · Gastrointestinal  o Abdomen  o : soft, non-tender, non-distended, + bowel sounds, no hepatosplenomegaly, no masses palpated  · Musculoskeletal  o General  o :   § General Musculoskeletal  § : No joint swelling or deformity., Muscle tone, strength, and development grossly normal.  · Neurologic  o Gait and Station  o :   § Gait Screening  § : normal gait  · Psychiatric  o Mood and Affect  o : mood normal, affect appropriate          Assessment  · Diabetes mellitus, type 2     250.00/E11.9  · Headache     784.0/R51  · Hyperlipidemia     272.4/E78.5  · Dizziness     780.4/R42  · Vision changes     368.9/H53.9  · UTI (urinary tract infection)     599.0/N39.0      Plan  · Orders  o CBC with Auto Diff [facility] (52936) - 780.4/R42, 272.4/E78.5, 250.00/E11.9 - 07/01/2019  o CMP [facility] (85983) - 780.4/R42, 272.4/E78.5, 250.00/E11.9 - 07/01/2019  o Hgb A1c [facility] (47236) - 780.4/R42, 272.4/E78.5, 250.00/E11.9 - 07/01/2019  o Lipid Panel [facility] (08147) - 780.4/R42, 272.4/E78.5, 250.00/E11.9 - 07/01/2019  o TSH [facility] (56862) - 780.4/R42, 272.4/E78.5, 250.00/E11.9 - 07/01/2019  o IOP - Urinalysis without Microscopy (Clinitek) [facility] (72935) - 780.4/R42, 272.4/E78.5, 250.00/E11.9 - 07/01/2019   GLU NEG, KEVYN NEG, KET NEG, SG 1.020, BLO NEG, PH 5.0, PRO NEG, URO 0.2, NIT NEG, STACEY TRACE  o Urine culture (47407, 09686) - 599.0/N39.0 - 07/01/2019  o Urine Drug Screen ([facility]) (12778) - 780.4/R42,  368.9/H53.9 - 07/01/2019  o ACO-39: Current medications updated and reviewed () - - 07/01/2019  o Vitamin B12 and folate measurement (25130, 00871) - 780.4/R42, 272.4/E78.5, 250.00/E11.9 - 07/01/2019  o NEUROLOGY CONSULTATION. (NEURO) - 780.4/R42, 784.0/R51, 368.9/H53.9 - 07/01/2019  o Carotid Doppler Bilateral [facility] (58702) - 780.4/R42 - 07/01/2019  o MRI brain multi-sequence wo then w contrast (84667) - 780.4/R42, 784.0/R51, 368.9/H53.9 - 07/01/2019  · Medications  o Macrobid 100 mg oral capsule   SIG: take 1 capsule (100 mg) by oral route every 12 hours with food for 7 days   DISP: (14) capsules with 0 refills  Prescribed on 07/01/2019     · Instructions  o Advised that cheeses and other sources of dairy fats, animal fats, fast food, and the extras (candy, pasteries, pies, doughnuts and cookies) all contain LDL raising nutrients. Advised to increase fruits, vegetables, whole grains, and to monitor portion sizes.   o Patient was educated/instructed on their diagnosis, treatment and medications prior to discharge from the clinic today.            Electronically Signed by: Jevon Fernandez MD -Author on July 1, 2019 04:06:16 PM

## 2021-05-07 NOTE — PROGRESS NOTES
Progress Note      Patient Name: Shantal Blackman   Patient ID: [id number]   Sex: Female   YOB: 1970    Primary Care Provider: Jevon Fernandez MD   Referring Provider: Jevon Fernandez MD    Visit Date: November 21, 2019    Provider: Jevon Fernandez MD   Location: [facility]   Location Address: [address] [place] [address]   [place]  [address]   Location Phone: [phone]          Chief Complaint     Referral for pain management. Back pain       History Of Present Illness  Shantal Blackman is a 49 year old /White female who presents for evaluation and treatment of:      pt has chronic low and middle back pain and was seeing [facility] pain and spine- pt was recently discharged and now is needing to see a new pain management doctor- pt does not take meds all the time- pt understands that we do not write for opioids and will need to get these meds from pain management  pt has no bowel or bladder incontinence    xrays:no fxs       Past Medical History  Disease Name Date Onset Notes   Anxiety --  --    Arthritis --  --    Bipolar disorder --  --    COPD (chronic obstructive pulmonary disease) --  --    Depression --  --    Diabetes mellitus, type 2 --  --    GERD (gastroesophageal reflux disease) --  --    Irritable bowel syndrome --  --    Migraines --  --    Seasonal allergies --  --    Suicide Attempt; unspecified means --  --          Past Surgical History  Procedure Name Date Notes   Hysterectomy --  --    Hysterectomy (due to cancer) --  --    Tubal ligation --  --          Medication List  Name Date Started Instructions   aspirin 81 mg oral tablet,delayed release (/EC) 03/27/2018 TAKE ONE TABLET BY MOUTH DAILY   doxycycline monohydrate 100 mg oral capsule 07/08/2019 take 1 capsule by oral route 2 times a day for 7 days   Fish Oil 1,000 mg (120 mg-180 mg) oral capsule 07/08/2019 take 2 capsules by oral route 2 times a day for 30 days   furosemide 20 mg oral tablet  03/20/2018 take 1 tablet (20 mg) by oral route once daily for 30 days   gabapentin 400 mg oral capsule  take 2 capsules by oral route 3 times a day   hydrocodone-acetaminophen 7.5-325 mg oral tablet  take 1 tablet by oral route every 4-6 hours as needed for pain   Linzess 145 mcg oral capsule 03/20/2018 TAKE ONE CAPSULE BY MOUTH DAILY AS NEEDED FOR CONSTIPATION   pantoprazole 40 mg oral tablet,delayed release (DR/EC) 03/20/2018 TAKE ONE TABLET BY MOUTH DAILY 30 MINUTES BEFORE BREAKFAST DAILY   potassium chloride 10 mEq oral capsule, extended release 08/29/2019 TAKE ONE CAPSULE BY MOUTH ONCE DAILY WITH FOOD FOR 30 DAYS         Allergy List  Allergen Name Date Reaction Notes   Celexa --  --  --    PENICILLINS --  --  --          Family Medical History  Disease Name Relative/Age Notes   Chronic Obstructive Pulmonary Disease Mother/   Mother   Bipolar Disorder Mother/   Mother   Depression Mother/   Mother   DM Type II Mother/   Mother   Hypertension Mother/   Mother   Alcohol abuse Father/   Father         Social History  Finding Status Start/Stop Quantity Notes   Alcohol Former --/-- --  drank alcohol in the past, 7 or less drinks per week   Exercises regularly --  --/-- --  occasionally   Recreational Drug Use Never --/-- --  never used   Second hand smoke exposure Unknown --/-- --  no   Tobacco Current every day --/-- 3-4 per day current some day smoker, for less than 5 years, currently uses other tobacco products   Uses seatbelts --  --/-- --  yes         Review of Systems  · Constitutional  o Denies  o : fatigue, fever  · Cardiovascular  o Denies  o : chest pain, palpitations  · Respiratory  o Denies  o : shortness of breath, cough  · Gastrointestinal  o Denies  o : nausea, vomiting, diarrhea  · Musculoskeletal  o Admits  o : back pain      Vitals  Date Time BP Position Site L\R Cuff Size HR RR TEMP (F) WT  HT  BMI kg/m2 BSA m2 O2 Sat HC       11/21/2019 04:58 /80 Sitting    70 - R  97.4 176lbs 16oz    98 %           Physical Examination  · Constitutional  o Appearance  o : well developed, well-nourished, in no acute distress  · Respiratory  o Respiratory Effort  o : breathing unlabored  o Auscultation of Lungs  o : clear to ascultation  · Cardiovascular  o Heart  o :   § Auscultation of Heart  § : regular rate and rhythm  o Peripheral Vascular System  o :   § Extremities  § : no edema  · Gastrointestinal  o Abdomen  o : soft, non-tender, non-distended, + bowel sounds, no hepatosplenomegaly, no masses palpated  · Musculoskeletal  o General  o :   § General Musculoskeletal  § : Bilateral lower and middle back paraspinal muscle tenderness, neg straight leg raise, normal ROM, stable.  · Neurologic  o Gait and Station  o :   § Gait Screening  § : normal gait  · Psychiatric  o Mood and Affect  o : mood normal, affect appropriate              Assessment  · Chronic low back pain       Low back pain     724.2/M54.5  Other chronic pain     724.2/G89.29  · Chronic back pain       Dorsalgia, unspecified     724.5/M54.9  Other chronic pain     724.5/G89.29      Plan  · Orders  o ACO-39: Current medications updated and reviewed () - - 11/21/2019  o PAIN MANAGEMENT CONSULTATION (PAINM) - 724.2/M54.5, 724.2/G89.29, 724.5/M54.9, 724.5/G89.29 - 11/21/2019  o Lumbar Spine AP and Lateral [facility] (78172) - 724.2/M54.5, 724.2/G89.29 - 11/21/2019  o Thoracic Spine (3 view) [facility] (77059) - 724.5/M54.9, 724.5/G89.29 - 11/21/2019  · Medications  o Medications have been Reconciled  o Transition of Care or Provider Policy  · Instructions  o Patient was educated/instructed on their diagnosis, treatment and medications prior to discharge from the clinic today.            Electronically Signed by: Jevon Fernandez MD -Author on November 21, 2019 06:18:29 PM

## 2021-05-09 VITALS
BODY MASS INDEX: 24.14 KG/M2 | HEIGHT: 69 IN | HEART RATE: 77 BPM | WEIGHT: 163 LBS | OXYGEN SATURATION: 98 % | SYSTOLIC BLOOD PRESSURE: 126 MMHG | DIASTOLIC BLOOD PRESSURE: 82 MMHG | TEMPERATURE: 96.7 F

## 2021-05-09 VITALS
DIASTOLIC BLOOD PRESSURE: 80 MMHG | SYSTOLIC BLOOD PRESSURE: 142 MMHG | TEMPERATURE: 97.4 F | WEIGHT: 177 LBS | OXYGEN SATURATION: 98 % | HEART RATE: 70 BPM

## 2021-05-09 VITALS
OXYGEN SATURATION: 99 % | TEMPERATURE: 97.3 F | WEIGHT: 225 LBS | HEIGHT: 69 IN | HEART RATE: 85 BPM | DIASTOLIC BLOOD PRESSURE: 72 MMHG | SYSTOLIC BLOOD PRESSURE: 114 MMHG | BODY MASS INDEX: 33.33 KG/M2

## 2021-05-09 VITALS
HEIGHT: 69 IN | HEART RATE: 80 BPM | DIASTOLIC BLOOD PRESSURE: 70 MMHG | TEMPERATURE: 98 F | BODY MASS INDEX: 24.37 KG/M2 | OXYGEN SATURATION: 98 % | WEIGHT: 164.5 LBS | SYSTOLIC BLOOD PRESSURE: 110 MMHG

## 2021-05-09 VITALS
WEIGHT: 183 LBS | HEART RATE: 75 BPM | SYSTOLIC BLOOD PRESSURE: 120 MMHG | DIASTOLIC BLOOD PRESSURE: 84 MMHG | OXYGEN SATURATION: 94 % | TEMPERATURE: 99.1 F

## 2021-05-10 NOTE — H&P
History and Physical      Patient Name: Shantal Blackman   Patient ID: [id number]   Sex: Female   YOB: 1970    Referring Provider: Jevon Fernandez MD    Visit Date: April 1, 2021    Provider: Suhail Liang MD   Location: [facility]   Location Address: [address] [place] [address]  [id number]   Location Phone: [phone]          Chief Complaint  · Right hand pain      History Of Present Illness  Shantal Blackman is a 50 year old /White female who presents today to [facility].      The patient presents here today for follow up evaluation of her right hand. The patient is S/P right carpal tunnel release, right 5th trigger finger release, and right thumb steroid injection 7/6/20. She has been doing well but now has locking of the right thumb. The thumb was held in the lock position. She is unable to straighten it. When it does straighten she cant bend it because of it catching and locking. She also notes a prominence over the volar MCP area of the thumb that is tender to palpation.       Past Medical History  Arthritis; Bowel Disease; Chest pain; Chronic Obstructive Pulmonary Disease; COPD (chronic obstructive pulmonary disease); Degenerative Disc Disease ; Diabetes; Heart attack; Herniated Disc; Limb Swelling; Migraines; Muscle cramps; Neurologic disorder; Reflux; Seasonal allergies; Shortness of Breath         Past Surgical History  Colonoscopy; Hysterectomy         Medication List  Aspir-81 81 mg oral tablet,delayed release (DR/EC); Norco 7.5-325 mg oral tablet; Protonix oral; Vitamin D3 oral         Allergy List  Celexa; flu vaccine ts 2011-12(18 yr+); PENICILLINS       Allergies Reconciled  Family Medical History  Stroke; Heart Disease; Cancer, Unspecified; Diabetes, unspecified type; Blood Diseases; Family history of certain chronic disabling diseases; arthritis; Family history of bleeding disorder; Family history of Arthritis; Family history of cancer;  "Family history of stroke; Family history of heart disease; Family history of diabetes mellitus; Family history of osteoporosis         Social History  Alcohol (Current some day); Alcohol Use (Current some day); Claustophobic (Unknown); ; lives with other; lives with spouse; .; Recreational Drug Use (Former); Single.; Tobacco (Current every day); Unemployed.         Review of Systems  · Constitutional  o Denies  o : fever, chills, weight loss  · Cardiovascular  o Denies  o : chest pain, shortness of breath  · Gastrointestinal  o Denies  o : liver disease, heartburn, nausea, blood in stools  · Genitourinary  o Denies  o : painful urination, blood in urine  · Integument  o Denies  o : rash, itching  · Neurologic  o Denies  o : headache, weakness, loss of consciousness  · Musculoskeletal  o Denies  o : painful, swollen joints  · Psychiatric  o Denies  o : drug/alcohol addiction, anxiety, depression      Vitals  Date Time BP Position Site L\R Cuff Size HR RR TEMP (F) WT  HT  BMI kg/m2 BSA m2 O2 Sat FR L/min FiO2 HC       04/01/2021 10:29 AM         189lbs 0oz 5'  9\" 27.91 2.04             Physical Examination  · Constitutional  o Appearance  o : well developed, well-nourished, no obvious deformities present  · Head and Face  o Head  o :   § Inspection  § : normocephalic  o Face  o :   § Inspection  § : no facial lesions  · Eyes  o Conjunctivae  o : conjunctivae normal  o Sclerae  o : sclerae white  · Ears, Nose, Mouth and Throat  o Ears  o :   § External Ears  § : appearance within normal limits  § Hearing  § : intact  o Nose  o :   § External Nose  § : appearance normal  · Neck  o Inspection/Palpation  o : normal appearance  o Range of Motion  o : full range of motion  · Respiratory  o Respiratory Effort  o : breathing unlabored  o Inspection of Chest  o : normal appearance  o Auscultation of Lungs  o : no audible wheezing or rales  · Cardiovascular  o Heart  o : regular " rate  · Gastrointestinal  o Abdominal Examination  o : soft and non-tender  · Skin and Subcutaneous Tissue  o General Inspection  o : intact, no rashes  · Psychiatric  o General  o : Alert and oriented x3  o Judgement and Insight  o : judgment and insight intact  o Mood and Affect  o : mood normal, affect appropriate  · Right Hand  o Inspection  o : Swelling and tenderness along the volar MCP of the thumb. Sensation to light touch median, radial, ulnar nerve. Positive AIN, PIN, ulnar nerve. Positive pulses. Limited ROM of the thumb. The IP joint is held in a flex position. Neurovascularly intact.           Assessment  · Arthralgia of right hand     719.44/M25.541  · Trigger thumb of right hand     727.03/M65.311      Plan  · Medications  o Medications have been Reconciled  o Transition of Care or Provider Policy  · Instructions  o Reviewed the patient's Past Medical, Social, and Family history as well as the ROS at today's visit, no changes.  o Call or return if worsening symptoms.  o Discussed surgery.  o Risks/benefits discussed with patient including, but not limited to: infection, bleeding, neurovascular damage, malunion, nonunion, aesthetic deformity, need for further surgery, and death.  o Surgery pamphlet given.  o This note was transcribed by Cinthia Valadez. margoth.  o Discussed the treatment options with the patient, operative vs non-operative. Discussed the risks and benefits of operative treatment. The patient expressed understanding and wishes to proceed. Plan for Right trigger thumb release. Follow up two weeks post-operatively.   o Electronically Identified Patient Education Materials Provided Electronically            Electronically Signed by: Cinthia Valadez MA -Author on April 5, 2021 08:29:34 AM  Electronically Co-signed by: Suhail Liang MD -Reviewer on April 5, 2021 09:10:14 PM

## 2021-05-13 NOTE — PROGRESS NOTES
Progress Note      Patient Name: Shantal Blackman   Patient ID: [id number]   Sex: Female   YOB: 1970    Referring Provider: Jevon Fernandez MD    Visit Date: July 21, 2020    Provider: Suhail Liang MD   Location: [facility]   Location Address: [address] [place] [address]  [id number]   Location Phone: [phone]          Chief Complaint  · Right hand pain      History Of Present Illness  Shantal Blackman is a 50 year old /White female who presents today to [facility]. Patient presents for post operative evaluation of right carpal tunnel release, right 5th trigger finger release, and right thumb steroid injection 7/6/20. Patient states she has been doing well, patient states she works as a , she went back to work 4 days after the surgery. Patient states she has been managing the pain with pain meds, she ran out yesterday and is requesting a refill. Patient states she has occasional shooting pain up her forearm from the carpal tunnel incision site, otherwise, she states she has normal sensation, and her thumb is doing well and her trigger finger has not had any reproducible triggering.       Past Medical History  Arthritis; Bowel Disease; Chest pain; Chronic Obstructive Pulmonary Disease; COPD (chronic obstructive pulmonary disease); Degenerative Disc Disease ; Diabetes; Heart attack; Herniated Disc; Limb Swelling; Migraines; Muscle cramps; Neurologic disorder; Reflux; Seasonal allergies; Shortness of Breath         Past Surgical History  Colonoscopy; Hysterectomy         Medication List  Aspir-81 81 mg oral tablet,delayed release (DR/EC); Protonix oral; Vitamin D3 oral         Allergy List  Celexa; flu vaccine ts 2011-12(18 yr+); PENICILLINS         Family Medical History  Stroke; Heart Disease; Cancer, Unspecified; Diabetes, unspecified type; Blood Diseases; Family history of certain chronic disabling diseases; arthritis; Family history of bleeding  "disorder; Family history of Arthritis; Family history of cancer; Family history of stroke; Family history of heart disease; Family history of diabetes mellitus; Family history of osteoporosis         Social History  Alcohol (Current some day); Claustophobic (Unknown); ; lives with other; Recreational Drug Use (Never); Single.; Tobacco (Current every day); Unemployed.         Review of Systems  · Constitutional  o Denies  o : fever, chills, weight loss  · Cardiovascular  o Denies  o : chest pain, shortness of breath  · Gastrointestinal  o Denies  o : liver disease, heartburn, nausea, blood in stools  · Genitourinary  o Denies  o : painful urination, blood in urine  · Integument  o Denies  o : rash, itching  · Neurologic  o Denies  o : headache, weakness, loss of consciousness  · Musculoskeletal  o Denies  o : painful, swollen joints  · Psychiatric  o Denies  o : drug/alcohol addiction, anxiety, depression      Vitals  Date Time BP Position Site L\R Cuff Size HR RR TEMP (F) WT  HT  BMI kg/m2 BSA m2 O2 Sat        07/21/2020 08:03 AM         174lbs 16oz 5'  9\" 25.84 1.97           Physical Examination  · Constitutional  o Appearance  o : well developed, well-nourished, no obvious deformities present  · Head and Face  o Head  o :   § Inspection  § : normocephalic  o Face  o :   § Inspection  § : no facial lesions  · Eyes  o Conjunctivae  o : conjunctivae normal  o Sclerae  o : sclerae white  · Ears, Nose, Mouth and Throat  o Ears  o :   § External Ears  § : appearance within normal limits  § Hearing  § : intact  o Nose  o :   § External Nose  § : appearance normal  · Neck  o Inspection/Palpation  o : normal appearance  o Range of Motion  o : full range of motion  · Respiratory  o Respiratory Effort  o : breathing unlabored  o Inspection of Chest  o : normal appearance  o Auscultation of Lungs  o : no audible wheezing or rales  · Cardiovascular  o Heart  o : regular rate  · Gastrointestinal  o Abdominal " Examination  o : soft and non-tender  · Skin and Subcutaneous Tissue  o General Inspection  o : intact, no rashes  · Psychiatric  o General  o : Alert and oriented x3  o Judgement and Insight  o : judgment and insight intact  o Mood and Affect  o : mood normal, affect appropriate  · Right Hand  o Inspection  o : Sutures removed today, incisions are healing well, no redness, no drainage, no signs of infection. Neurovascularly intact, no reproducible triggering.           Assessment  · Aftercare following surgery of right carpal tunnel release, 5th trigger finger release, thumb steroid injection. 7/6/20     V54.81  · Arthralgia of right hand     719.44/M25.541      Plan  · Medications  o Medications have been Reconciled  o Transition of Care or Provider Policy  · Instructions  o Reviewed the patient's Past Medical, Social, and Family history as well as the ROS at today's visit, no changes.  o Call or return if worsening symptoms.  o Follow Up in 4 weeks.   o The above service was scribed by Jenaro Drake PA-C on my behalf and I attest to the accuracy of the note. jsb  o Discussed surgery with the patient, she is requesting to do physical therapy and order was written. New pain medication refill was given and patient was advised that after this refill to start over the counter NSAIDs. Follow up in 4 weeks.             Electronically Signed by: Koko Drake PA-C -Author on July 21, 2020 08:54:36 AM  Electronically Co-signed by: Suhail Liang MD -Reviewer on July 21, 2020 09:49:50 PM

## 2021-05-14 VITALS — BODY MASS INDEX: 27.99 KG/M2 | WEIGHT: 189 LBS | HEIGHT: 69 IN

## 2021-05-14 VITALS — HEIGHT: 69 IN | BODY MASS INDEX: 25.92 KG/M2 | WEIGHT: 175 LBS

## 2021-05-14 NOTE — PROGRESS NOTES
Progress Note      Patient Name: Shantal Blackman   Patient ID: [id number]   Sex: Female   YOB: 1970    Referring Provider: Jevon Fernandez MD    Visit Date: April 16, 2021    Provider: Suhail Liang MD   Location: [facility]   Location Address: [address] [place] [address]  [id number]   Location Phone: [phone]          Chief Complaint  · Right hand pain      History Of Present Illness  Shantal Blackman is a 51 year old /White female who presents today to [facility].      The patient presents here today for follow up evaluation of her right thumb. The patient is S/P trigger thumb release 4/2/2021. Her sutures were removed today. She does report some pain. She is overall doing well. She has no other complaints.       Past Medical History  Arthritis; Bowel Disease; Chest pain; Chronic Obstructive Pulmonary Disease; COPD (chronic obstructive pulmonary disease); Degenerative Disc Disease ; Diabetes; Heart attack; Herniated Disc; Limb Swelling; Migraines; Muscle cramps; Neurologic disorder; Reflux; Seasonal allergies; Shortness of Breath         Past Surgical History  Colonoscopy; Hysterectomy         Medication List  Aspir-81 81 mg oral tablet,delayed release (DR/EC); Norco 7.5-325 mg oral tablet; Protonix oral; Vitamin D3 oral         Allergy List  Celexa; flu vaccine ts 2011-12(18 yr+); PENICILLINS       Allergies Reconciled  Family Medical History  Stroke; Heart Disease; Cancer, Unspecified; Diabetes, unspecified type; Blood Diseases; Family history of certain chronic disabling diseases; arthritis; Family history of bleeding disorder; Family history of Arthritis; Family history of cancer; Family history of stroke; Family history of heart disease; Family history of diabetes mellitus; Family history of osteoporosis         Social History  Alcohol (Current some day); Alcohol Use (Current some day); Claustophobic (Unknown); ; lives with other; lives with  "spouse; .; Recreational Drug Use (Former); Single.; Tobacco (Current every day); Unemployed.         Review of Systems  · Constitutional  o Denies  o : fever, chills, weight loss  · Cardiovascular  o Denies  o : chest pain, shortness of breath  · Gastrointestinal  o Denies  o : liver disease, heartburn, nausea, blood in stools  · Genitourinary  o Denies  o : painful urination, blood in urine  · Integument  o Denies  o : rash, itching  · Neurologic  o Denies  o : headache, weakness, loss of consciousness  · Musculoskeletal  o Denies  o : painful, swollen joints  · Psychiatric  o Denies  o : drug/alcohol addiction, anxiety, depression      Vitals  Date Time BP Position Site L\R Cuff Size HR RR TEMP (F) WT  HT  BMI kg/m2 BSA m2 O2 Sat FR L/min FiO2 HC       04/16/2021 01:13 PM         174lbs 16oz 5'  9\" 25.84 1.97             Physical Examination  · Constitutional  o Appearance  o : well developed, well-nourished, no obvious deformities present  · Head and Face  o Head  o :   § Inspection  § : normocephalic  o Face  o :   § Inspection  § : no facial lesions  · Eyes  o Conjunctivae  o : conjunctivae normal  o Sclerae  o : sclerae white  · Ears, Nose, Mouth and Throat  o Ears  o :   § External Ears  § : appearance within normal limits  § Hearing  § : intact  o Nose  o :   § External Nose  § : appearance normal  · Neck  o Inspection/Palpation  o : normal appearance  o Range of Motion  o : full range of motion  · Respiratory  o Respiratory Effort  o : breathing unlabored  o Inspection of Chest  o : normal appearance  o Auscultation of Lungs  o : no audible wheezing or rales  · Cardiovascular  o Heart  o : regular rate  · Gastrointestinal  o Abdominal Examination  o : soft and non-tender  · Skin and Subcutaneous Tissue  o General Inspection  o : intact, no rashes  · Psychiatric  o General  o : Alert and oriented x3  o Judgement and Insight  o : judgment and insight intact  o Mood and Affect  o : mood normal, affect " appropriate  · Right Hand  o Inspection  o : Incision well healing. No signs of infection. ROM intact with no triggering and locking. Neurovascularly intact. Sensation intact to light touch.           Assessment  · Aftercare following Right trigger thumb release     V54.81  · Arthralgia of right hand     719.44/M25.541      Plan  · Medications  o Medications have been Reconciled  o Transition of Care or Provider Policy  · Instructions  o Reviewed the patient's Past Medical, Social, and Family history as well as the ROS at today's visit, no changes.  o Call or return if worsening symptoms.  o The above service was scribed by Cinthia Valadez on my behalf and I attest to the accuracy of the note. jsb  o Discussed the treatment plan with the patient. The patient can work on ROM. Follow up in 1 month to recheck.  o Electronically Identified Patient Education Materials Provided Electronically            Electronically Signed by: Cinthia Valadez MA -Author on April 16, 2021 01:22:56 PM  Electronically Co-signed by: Suhail Liang MD -Reviewer on April 18, 2021 07:25:38 PM

## 2021-05-15 VITALS
WEIGHT: 168 LBS | HEIGHT: 69 IN | BODY MASS INDEX: 24.88 KG/M2 | SYSTOLIC BLOOD PRESSURE: 118 MMHG | DIASTOLIC BLOOD PRESSURE: 60 MMHG | SYSTOLIC BLOOD PRESSURE: 127 MMHG | HEART RATE: 50 BPM | DIASTOLIC BLOOD PRESSURE: 74 MMHG | HEART RATE: 60 BPM

## 2021-05-15 VITALS — HEART RATE: 54 BPM | WEIGHT: 175 LBS | HEIGHT: 69 IN | OXYGEN SATURATION: 99 % | BODY MASS INDEX: 25.92 KG/M2

## 2021-05-15 VITALS — WEIGHT: 175 LBS | HEIGHT: 69 IN | BODY MASS INDEX: 25.92 KG/M2

## 2021-05-16 VITALS — BODY MASS INDEX: 32.79 KG/M2 | WEIGHT: 221.37 LBS | HEIGHT: 69 IN

## 2021-09-03 PROCEDURE — U0003 INFECTIOUS AGENT DETECTION BY NUCLEIC ACID (DNA OR RNA); SEVERE ACUTE RESPIRATORY SYNDROME CORONAVIRUS 2 (SARS-COV-2) (CORONAVIRUS DISEASE [COVID-19]), AMPLIFIED PROBE TECHNIQUE, MAKING USE OF HIGH THROUGHPUT TECHNOLOGIES AS DESCRIBED BY CMS-2020-01-R: HCPCS | Performed by: FAMILY MEDICINE

## 2021-09-04 ENCOUNTER — TELEPHONE (OUTPATIENT)
Dept: URGENT CARE | Facility: CLINIC | Age: 51
End: 2021-09-04

## 2021-09-06 ENCOUNTER — TELEPHONE (OUTPATIENT)
Dept: URGENT CARE | Facility: CLINIC | Age: 51
End: 2021-09-06

## 2021-09-06 NOTE — TELEPHONE ENCOUNTER
Patient is contacted with her positive Covid results.  Minimal symptoms.  She is feeling some better.  Plan quarantine until September 13.  All nonvaccinated family were should quarantine with her.  Return to [facility] if any concerns, go to the emergency room for any worsening symptoms

## 2021-09-16 ENCOUNTER — OFFICE VISIT (OUTPATIENT)
Dept: FAMILY MEDICINE CLINIC | Facility: CLINIC | Age: 51
End: 2021-09-16

## 2021-09-16 VITALS
WEIGHT: 178 LBS | DIASTOLIC BLOOD PRESSURE: 85 MMHG | HEART RATE: 65 BPM | SYSTOLIC BLOOD PRESSURE: 150 MMHG | TEMPERATURE: 98 F | BODY MASS INDEX: 26.29 KG/M2 | OXYGEN SATURATION: 99 %

## 2021-09-16 DIAGNOSIS — Z86.16 HISTORY OF COVID-19: Primary | ICD-10-CM

## 2021-09-16 DIAGNOSIS — E78.2 MIXED HYPERLIPIDEMIA: ICD-10-CM

## 2021-09-16 DIAGNOSIS — R73.03 PREDIABETES: ICD-10-CM

## 2021-09-16 DIAGNOSIS — G62.9 PERIPHERAL POLYNEUROPATHY: ICD-10-CM

## 2021-09-16 LAB
ALBUMIN SERPL-MCNC: 4.2 G/DL (ref 3.5–5.2)
ALBUMIN/GLOB SERPL: 1.6 G/DL
ALP SERPL-CCNC: 70 U/L (ref 39–117)
ALT SERPL W P-5'-P-CCNC: 14 U/L (ref 1–33)
ANION GAP SERPL CALCULATED.3IONS-SCNC: 7.7 MMOL/L (ref 5–15)
AST SERPL-CCNC: 7 U/L (ref 1–32)
BASOPHILS # BLD AUTO: 0.02 10*3/MM3 (ref 0–0.2)
BASOPHILS NFR BLD AUTO: 0.3 % (ref 0–1.5)
BILIRUB SERPL-MCNC: 0.3 MG/DL (ref 0–1.2)
BUN SERPL-MCNC: 9 MG/DL (ref 6–20)
BUN/CREAT SERPL: 9.7 (ref 7–25)
CALCIUM SPEC-SCNC: 9.5 MG/DL (ref 8.6–10.5)
CHLORIDE SERPL-SCNC: 103 MMOL/L (ref 98–107)
CO2 SERPL-SCNC: 31.3 MMOL/L (ref 22–29)
CREAT SERPL-MCNC: 0.93 MG/DL (ref 0.57–1)
DEPRECATED RDW RBC AUTO: 44.8 FL (ref 37–54)
EOSINOPHIL # BLD AUTO: 0.09 10*3/MM3 (ref 0–0.4)
EOSINOPHIL NFR BLD AUTO: 1.2 % (ref 0.3–6.2)
ERYTHROCYTE [DISTWIDTH] IN BLOOD BY AUTOMATED COUNT: 12.6 % (ref 12.3–15.4)
EXPIRATION DATE: NORMAL
GFR SERPL CREATININE-BSD FRML MDRD: 64 ML/MIN/1.73
GLOBULIN UR ELPH-MCNC: 2.7 GM/DL
GLUCOSE SERPL-MCNC: 131 MG/DL (ref 65–99)
HBA1C MFR BLD: 6.3 % (ref 4.8–5.6)
HCT VFR BLD AUTO: 42 % (ref 34–46.6)
HGB BLD-MCNC: 14.1 G/DL (ref 12–15.9)
IMM GRANULOCYTES # BLD AUTO: 0.02 10*3/MM3 (ref 0–0.05)
IMM GRANULOCYTES NFR BLD AUTO: 0.3 % (ref 0–0.5)
INTERNAL CONTROL: NORMAL
LYMPHOCYTES # BLD AUTO: 2.28 10*3/MM3 (ref 0.7–3.1)
LYMPHOCYTES NFR BLD AUTO: 29.3 % (ref 19.6–45.3)
Lab: NORMAL
MCH RBC QN AUTO: 32.6 PG (ref 26.6–33)
MCHC RBC AUTO-ENTMCNC: 33.6 G/DL (ref 31.5–35.7)
MCV RBC AUTO: 97.2 FL (ref 79–97)
MONOCYTES # BLD AUTO: 0.54 10*3/MM3 (ref 0.1–0.9)
MONOCYTES NFR BLD AUTO: 6.9 % (ref 5–12)
NEUTROPHILS NFR BLD AUTO: 4.82 10*3/MM3 (ref 1.7–7)
NEUTROPHILS NFR BLD AUTO: 62 % (ref 42.7–76)
NRBC BLD AUTO-RTO: 0.1 /100 WBC (ref 0–0.2)
PLATELET # BLD AUTO: 234 10*3/MM3 (ref 140–450)
PMV BLD AUTO: 11.9 FL (ref 6–12)
POTASSIUM SERPL-SCNC: 3.5 MMOL/L (ref 3.5–5.2)
PROT SERPL-MCNC: 6.9 G/DL (ref 6–8.5)
RBC # BLD AUTO: 4.32 10*6/MM3 (ref 3.77–5.28)
SARS-COV-2 AG UPPER RESP QL IA.RAPID: NOT DETECTED
SODIUM SERPL-SCNC: 142 MMOL/L (ref 136–145)
WBC # BLD AUTO: 7.77 10*3/MM3 (ref 3.4–10.8)

## 2021-09-16 PROCEDURE — 83036 HEMOGLOBIN GLYCOSYLATED A1C: CPT | Performed by: FAMILY MEDICINE

## 2021-09-16 PROCEDURE — 85025 COMPLETE CBC W/AUTO DIFF WBC: CPT | Performed by: FAMILY MEDICINE

## 2021-09-16 PROCEDURE — 99213 OFFICE O/P EST LOW 20 MIN: CPT | Performed by: FAMILY MEDICINE

## 2021-09-16 PROCEDURE — 87426 SARSCOV CORONAVIRUS AG IA: CPT | Performed by: FAMILY MEDICINE

## 2021-09-16 PROCEDURE — 80053 COMPREHEN METABOLIC PANEL: CPT | Performed by: FAMILY MEDICINE

## 2021-09-16 NOTE — PROGRESS NOTES
Venipuncture Blood Specimen Collection  Venipuncture performed in left arm by Blank Ibrahim with good hemostasis. Patient tolerated the procedure well without complications.   09/16/21   Blank Ibrahim

## 2021-09-16 NOTE — PROGRESS NOTES
Chief Complaint  Exposure To Known Illness (Follow-up from Covid. Tested pos august 3rd and needs neg test to return. )    Subjective          Shantal Blackman presents to [facility]  Pt had covid in early August- pt no longer having symptoms- pt needs neg covid test to return to work    ROS: pt has had no recent fevers, soa, cough, vision changes, headaches, chest pains, palpitations, syncope, dizziness, nausea, vomiting, diarrhea, abdominal pain, rashes, joint pain, depression, anxiety, memory problems, leg swelling.      Pt has chronic neuropathy      Objective   Allergies   Allergen Reactions   • Peanut Oil Anaphylaxis   • Citalopram Hives     N/v   • Flu Virus Vaccine Mental Status Change   • Penicillins Hives       There is no immunization history on file for this patient.    Vital Signs:   Vitals:    09/16/21 0955   BP: 150/85   Pulse: 65   Temp: 98 °F (36.7 °C)   SpO2: 99%   Weight: 80.7 kg (178 lb)       Physical Exam  Vitals reviewed.   Constitutional:       Appearance: Normal appearance. She is well-developed.   HENT:      Head: Normocephalic and atraumatic.      Right Ear: Tympanic membrane, ear canal and external ear normal.      Left Ear: Tympanic membrane, ear canal and external ear normal.      Nose: Nose normal.      Mouth/Throat:      Mouth: Mucous membranes are moist.      Pharynx: Oropharynx is clear. No oropharyngeal exudate or posterior oropharyngeal erythema.   Eyes:      Conjunctiva/sclera: Conjunctivae normal.      Pupils: Pupils are equal, round, and reactive to light.   Cardiovascular:      Rate and Rhythm: Normal rate and regular rhythm.      Pulses: Normal pulses.      Heart sounds: Normal heart sounds. No murmur heard.   No friction rub. No gallop.    Pulmonary:      Effort: Pulmonary effort is normal.      Breath sounds: Normal breath sounds. No wheezing or rhonchi.   Abdominal:      General: Bowel sounds are normal. There is no distension.       Palpations: Abdomen is soft.      Tenderness: There is no abdominal tenderness.   Musculoskeletal:         General: Normal range of motion.      Cervical back: Normal range of motion and neck supple.   Skin:     General: Skin is warm and dry.      Capillary Refill: Capillary refill takes less than 2 seconds.   Neurological:      Mental Status: She is alert and oriented to person, place, and time.      Cranial Nerves: No cranial nerve deficit.   Psychiatric:         Mood and Affect: Mood and affect normal.         Behavior: Behavior normal.         Thought Content: Thought content normal.         Judgment: Judgment normal.        Result Review :                 Assessment and Plan    Diagnoses and all orders for this visit:    1. History of COVID-19 (Primary)  -     CBC Auto Differential  -     POCT SARS-CoV-2 Antigen BRITANY    2. Prediabetes  -     Comprehensive Metabolic Panel  -     Hemoglobin A1c    3. Peripheral polyneuropathy  -     CBC Auto Differential  -     Ambulatory Referral to Neurology    4. Mixed hyperlipidemia  -     Comprehensive Metabolic Panel  -     Cancel: Lipid Panel  -     Lipid Panel; Future            Follow Up   Return if symptoms worsen or fail to improve.  Patient was given instructions and counseling regarding her condition or for health maintenance advice. Please see specific information pulled into the AVS if appropriate.

## 2021-09-17 ENCOUNTER — TELEPHONE (OUTPATIENT)
Dept: FAMILY MEDICINE CLINIC | Facility: CLINIC | Age: 51
End: 2021-09-17

## 2021-09-17 DIAGNOSIS — R06.00 DYSPNEA, UNSPECIFIED TYPE: ICD-10-CM

## 2021-09-17 DIAGNOSIS — J44.9 CHRONIC OBSTRUCTIVE PULMONARY DISEASE, UNSPECIFIED COPD TYPE (HCC): ICD-10-CM

## 2021-09-17 RX ORDER — ALBUTEROL SULFATE 90 UG/1
2 AEROSOL, METERED RESPIRATORY (INHALATION) EVERY 6 HOURS PRN
Qty: 18 G | Refills: 3 | Status: SHIPPED | OUTPATIENT
Start: 2021-09-17 | End: 2021-10-17

## 2021-09-28 RX ORDER — PANTOPRAZOLE SODIUM 40 MG/1
TABLET, DELAYED RELEASE ORAL
Qty: 30 TABLET | Refills: 2 | Status: SHIPPED | OUTPATIENT
Start: 2021-09-28

## 2022-08-08 ENCOUNTER — HOSPITAL ENCOUNTER (EMERGENCY)
Facility: HOSPITAL | Age: 52
Discharge: LEFT BEFORE DISCHARGE | End: 2022-08-08

## 2022-08-08 PROCEDURE — 99211 OFF/OP EST MAY X REQ PHY/QHP: CPT

## 2022-10-03 DIAGNOSIS — J44.9 CHRONIC OBSTRUCTIVE PULMONARY DISEASE, UNSPECIFIED COPD TYPE: ICD-10-CM

## 2022-10-03 DIAGNOSIS — R06.00 DYSPNEA, UNSPECIFIED TYPE: ICD-10-CM

## 2022-12-29 ENCOUNTER — OFFICE VISIT (OUTPATIENT)
Dept: ORTHOPEDIC SURGERY | Facility: CLINIC | Age: 52
End: 2022-12-29

## 2022-12-29 VITALS — HEART RATE: 60 BPM | OXYGEN SATURATION: 99 % | WEIGHT: 183 LBS | BODY MASS INDEX: 27.11 KG/M2 | HEIGHT: 69 IN

## 2022-12-29 DIAGNOSIS — M79.641 RIGHT HAND PAIN: Primary | ICD-10-CM

## 2022-12-29 DIAGNOSIS — M18.11 OSTEOARTHRITIS OF RIGHT THUMB: ICD-10-CM

## 2022-12-29 PROCEDURE — 99213 OFFICE O/P EST LOW 20 MIN: CPT | Performed by: ORTHOPAEDIC SURGERY

## 2022-12-29 RX ORDER — DICLOFENAC SODIUM 75 MG/1
75 TABLET, DELAYED RELEASE ORAL 2 TIMES DAILY
Qty: 60 TABLET | Refills: 2 | Status: SHIPPED | OUTPATIENT
Start: 2022-12-29

## 2022-12-29 NOTE — PROGRESS NOTES
"Chief Complaint  Pain and Follow-up of the Right Hand     Subjective      Shantal Blackman presents to [facility] for follow up evaluation of the right hand. The patient has shooting pain to her right thumb. She denies injury or trauma. She was previously seen for a trigger finger to her small finger. She has no new complaints. She takes ibuprofen as needed. She has occasional catching.     Allergies   Allergen Reactions   • Peanut Oil Anaphylaxis   • Citalopram Hives     N/v   • Influenza Virus Vaccine Mental Status Change   • Penicillins Hives        Social History     Socioeconomic History   • Marital status:    Tobacco Use   • Smoking status: Every Day     Packs/day: 0.50     Years: 25.00     Pack years: 12.50     Types: Cigarettes   • Smokeless tobacco: Never   Vaping Use   • Vaping Use: Never used   Substance and Sexual Activity   • Alcohol use: Not Currently   • Drug use: Yes     Types: Marijuana     Comment: joseph        Review of Systems     Objective   Vital Signs:   Pulse 60   Ht 175.3 cm (69\")   Wt 83 kg (183 lb)   SpO2 99%   BMI 27.02 kg/m²       Physical Exam  Constitutional:       Appearance: Normal appearance. The patient is well-developed and normal weight.   HENT:      Head: Normocephalic.      Right Ear: Hearing and external ear normal.      Left Ear: Hearing and external ear normal.      Nose: Nose normal.   Eyes:      Conjunctiva/sclera: Conjunctivae normal.   Cardiovascular:      Rate and Rhythm: Normal rate.   Pulmonary:      Effort: Pulmonary effort is normal.      Breath sounds: No wheezing or rales.   Abdominal:      Palpations: Abdomen is soft.      Tenderness: There is no abdominal tenderness.   Musculoskeletal:      Cervical back: Normal range of motion.   Skin:     Findings: No rash.   Neurological:      Mental Status: The patient is alert and oriented to person, place, and time.   Psychiatric:         Mood and Affect: Mood and affect normal.    "      Judgment: Judgment normal.       Ortho Exam      Right hand- tender to the thumb CMC joint and MCP joint. No catching or locking of the thumb. Neurovascularly intact. Sensation to light touch median, radial, ulnar nerve. Positive AIN, PIN, ulnar nerve motor function. Positive pulses.     Procedures    X-Ray Report:  Right hand  X-Ray  Indication: Evaluation of right hand pain  AP/Lateral view(s)  Findings: mild degenerative changes to the CMC and MCP joint of the thumb. No acute fracture   Prior studies available for comparison: yes        Imaging Results (Most Recent)     Procedure Component Value Units Date/Time    XR Hand 2 View Right [[id number]] Resulted: 12/29/22 1434     Updated: 12/29/22 1439           Result Review :       No results found.           Assessment and Plan     Diagnoses and all orders for this visit:    1. Right hand pain (Primary)  -     XR Hand 2 View Right    2. Osteoarthritis of right thumb        Discussed the treatment plan with the patient.  I reviewed the x-rays that were obtained today with the patient. Plan for conservative treatment. Prescription for diclofenac given today. Thumb-o-prene brace given today.     Educated on risk of smoking. Discussed options for smoking cessation. and Call or return if worsening symptoms.    Follow Up     6weeks      Patient was given instructions and counseling regarding her condition or for health maintenance advice. Please see specific information pulled into the AVS if appropriate.     Scribed for Suhail Liang MD by Cinthia Valadez.  12/29/22   14:50 EST    I have personally performed the services described in this document as scribed by the above individual and it is both accurate and complete. Suhail Liang MD 12/30/22

## 2023-02-09 PROBLEM — M79.641 RIGHT HAND PAIN: Status: ACTIVE | Noted: 2023-02-09
